# Patient Record
Sex: MALE | Race: WHITE
[De-identification: names, ages, dates, MRNs, and addresses within clinical notes are randomized per-mention and may not be internally consistent; named-entity substitution may affect disease eponyms.]

---

## 2022-06-10 ENCOUNTER — HOSPITAL ENCOUNTER (EMERGENCY)
Dept: HOSPITAL 54 - ER | Age: 52
Discharge: HOME | End: 2022-06-10
Payer: COMMERCIAL

## 2022-06-10 VITALS — DIASTOLIC BLOOD PRESSURE: 97 MMHG | SYSTOLIC BLOOD PRESSURE: 174 MMHG

## 2022-06-10 VITALS — BODY MASS INDEX: 28.56 KG/M2 | HEIGHT: 71 IN | WEIGHT: 204 LBS

## 2022-06-10 DIAGNOSIS — S01.111A: Primary | ICD-10-CM
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
## 2022-06-10 DIAGNOSIS — E11.9: ICD-10-CM
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
## 2022-06-10 DIAGNOSIS — S01.01XA: ICD-10-CM

## 2022-06-10 DIAGNOSIS — L08.9: ICD-10-CM
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
## 2022-06-10 DIAGNOSIS — E78.5: ICD-10-CM

## 2022-06-10 DIAGNOSIS — S01.81XA: ICD-10-CM
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
## 2022-06-10 PROCEDURE — 12055 INTMD RPR FACE/MM 12.6-20 CM: CPT

## 2022-06-10 PROCEDURE — 99284 EMERGENCY DEPT VISIT MOD MDM: CPT

## 2022-06-10 NOTE — NUR
PT FULLY AWKE AND ALERT NO WEEKNEES  D/C INSTRACTION GIVEN  TO  PT  FULLY AND  
VERBLIZED  UNDERSTOOD  D/C  HOME STABLE  CONDITION

## 2022-06-10 NOTE — NUR
ADONIS SAMANIEGO  AT  BED  PT   2  LACERATION  ON  FOR HEAD AND  ABOVE RT 
EYES  CLEAN  AND  SUTURE  AND  STABL   DONE